# Patient Record
Sex: FEMALE | Race: WHITE | NOT HISPANIC OR LATINO | URBAN - METROPOLITAN AREA
[De-identification: names, ages, dates, MRNs, and addresses within clinical notes are randomized per-mention and may not be internally consistent; named-entity substitution may affect disease eponyms.]

---

## 2017-05-22 ENCOUNTER — HOSPITAL ENCOUNTER (OUTPATIENT)
Dept: RADIOLOGY | Facility: HOSPITAL | Age: 82
Discharge: HOME | End: 2017-05-22
Attending: INTERNAL MEDICINE

## 2017-05-22 ENCOUNTER — HOSPITAL ENCOUNTER (OUTPATIENT)
Dept: CARDIOLOGY | Facility: HOSPITAL | Age: 82
Discharge: HOME | End: 2017-05-22
Attending: INTERNAL MEDICINE

## 2017-05-22 ENCOUNTER — OFFICE VISIT - HEALTHEAST (OUTPATIENT)
Dept: CARDIOLOGY | Facility: CLINIC | Age: 82
End: 2017-05-22

## 2017-05-22 DIAGNOSIS — I10 ESSENTIAL HYPERTENSION WITH GOAL BLOOD PRESSURE LESS THAN 140/90: ICD-10-CM

## 2017-05-22 DIAGNOSIS — I25.10 ATHEROSCLEROSIS OF NATIVE CORONARY ARTERY OF NATIVE HEART WITHOUT ANGINA PECTORIS: ICD-10-CM

## 2017-05-22 DIAGNOSIS — R09.89 LUNG CRACKLES: ICD-10-CM

## 2017-05-22 DIAGNOSIS — I48.0 PAROXYSMAL ATRIAL FIBRILLATION (H): ICD-10-CM

## 2017-05-22 ASSESSMENT — MIFFLIN-ST. JEOR: SCORE: 871.7

## 2017-10-17 ENCOUNTER — AMBULATORY - HEALTHEAST (OUTPATIENT)
Dept: CARDIOLOGY | Facility: CLINIC | Age: 82
End: 2017-10-17

## 2018-04-23 ENCOUNTER — COMMUNICATION - HEALTHEAST (OUTPATIENT)
Dept: ADMINISTRATIVE | Facility: CLINIC | Age: 83
End: 2018-04-23

## 2021-05-30 VITALS — HEIGHT: 59 IN | WEIGHT: 127 LBS | BODY MASS INDEX: 25.6 KG/M2

## 2021-06-15 PROBLEM — R09.89 LUNG CRACKLES: Status: ACTIVE | Noted: 2017-05-22

## 2021-06-16 PROBLEM — I48.20 CHRONIC ATRIAL FIBRILLATION WITH RVR (H): Status: ACTIVE | Noted: 2017-10-15

## 2021-06-16 PROBLEM — I50.43 ACUTE ON CHRONIC COMBINED SYSTOLIC AND DIASTOLIC CHF (CONGESTIVE HEART FAILURE) (H): Status: ACTIVE | Noted: 2017-10-15

## 2021-06-25 NOTE — PROGRESS NOTES
Progress Notes by Fabrizio Amin MD at 5/22/2017 10:10 AM     Author: Fabrizio Amin MD Service: -- Author Type: Physician    Filed: 5/22/2017 12:17 PM Encounter Date: 5/22/2017 Status: Signed    : Fabrizio Amin MD (Physician)           Click to link to [facility] HEART CARE NOTE    Thank you, Dr. Toussaint, for asking us to see Misti Hall at the [facility].      Assessment/Recommendations   Patient with a known history of coronary artery disease as well as atrial fibrillation and atrial flutter.  She does feel some palpitations on a near daily basis and it does affect her functional capacity to a small degree as she needs to sit down and rest.  She also has some crackles at the right base which sound a bit dry in nature and I do not think she has pulmonary vascular  congestion but I cannot exclude that possibility.  She also complains of a very dry mouth.    We will check a basic metabolic panel to make sure that her BUN and creatinine are normal given her dry mouth and diuretic use.  We will check a Holter monitor to see what her rhythm disturbances when she has these symptoms as well as a chest x-ray because of the crackles.    We will get back to her with the results of these findings and any further recommendations.    Thank you for allowing us to participate in her care.         History of Present Illness    Ms. Misti Hall is a 89 y.o. female with a distant history of bypass surgery.  She has been feeling reasonably well of late but complains of a dry mouth, intermittent palpitations and shortness of breath with activity.  Her shortness of breath with activity may be a little more dramatic.  She did not get short of breath walking in here and she uses a four-wheel walker consistently.  She has not fallen.  She denies any chest discomfort.  She complains of a dry mouth which is been bothering her over the last 6 weeks.  She is taking the same  dose of furosemide for quite some time.    She also complains of intermittent palpitations where she feels her heart jumping and racing.  This will last a couple of minutes and she needs to sit down and rest and it seems to go away on its own.  This happens every day.    She denies orthopnea, paroxysmal nocturnal dyspnea and has some mild peripheral edema which she does not think is changed much of late.         Physical Examination Review of Systems   Vitals:    05/22/17 1006   BP: 124/80   Pulse: 88   Resp: 18     Body mass index is 25.65 kg/(m^2).  Wt Readings from Last 3 Encounters:   05/22/17 127 lb (57.6 kg)   04/04/16 121 lb (54.9 kg)   03/23/15 119 lb (54 kg)     General Appearance:   Alert, cooperative and in no acute distress.   ENT/Mouth: Oral mucuos membranes pink and moist .      EYES:  No scleral icterus. No Xanthelasma.    Neck: JVP normal. No Hepatojugular reflux. Thyroid not visualizedly   Chest/Lungs:   Lungs have dry crackles at the right base, equal chest wall expansion    Cardiovascular:   S1, S2 without murmur ,clicks or rubs. Brachial, radial  pulses are intact and symetric. No carotid bruits noted   Abdomen:  Nontender. BS+. No bruits.      Extremities: No cyanosis, clubbing or edema   Skin: no xanthelasma, warm.    Psych: Appropriate affect.   Neurologic: , normal  bilateral, no tremors        General: WNL  Eyes: WNL  Ears/Nose/Throat: WNL  Lungs: Wheezing  Heart: WNL  Stomach: WNL  Bladder: WNL  Muscle/Joints: WNL  Skin: WNL  Nervous System: WNL  Mental Health: WNL     Blood: WNL     Medical History  Surgical History Family History Social History   No past medical history on file. Past Surgical History:   Procedure Laterality Date   ? LA CABG, VEIN, SINGLE      Description: CABG (CABG);  Recorded: 02/29/2012;  Comments: approx. 2000, no records available    No family history on file. Social History     Social History   ? Marital status:      Spouse name: N/A   ? Number of  children: N/A   ? Years of education: N/A     Occupational History   ? Not on file.     Social History Main Topics   ? Smoking status: Never Smoker   ? Smokeless tobacco: Not on file   ? Alcohol use Not on file   ? Drug use: No   ? Sexual activity: Not on file     Other Topics Concern   ? Not on file     Social History Narrative          Medications  Allergies   Current Outpatient Prescriptions   Medication Sig Dispense Refill   ? atorvastatin (LIPITOR) 20 MG tablet Take 1 tablet by mouth daily.     ? furosemide (LASIX) 40 MG tablet Take 40 mg by mouth daily.     ? isosorbide mononitrate (IMDUR) 30 MG 24 hr tablet Take 30 mg by mouth daily.     ? levothyroxine (SYNTHROID, LEVOTHROID) 88 MCG tablet Take 88 mcg by mouth daily.     ? lisinopril (PRINIVIL,ZESTRIL) 10 MG tablet Take 1 tablet by mouth daily.     ? nitroglycerin (NITROSTAT) 0.4 MG SL tablet Take as directed     ? sotalol (BETAPACE) 80 MG tablet Take 80 mg by mouth every 12 (twelve) hours.       No current facility-administered medications for this visit.       No Known Allergies      Lab Results    Chemistry/lipid CBC Cardiac Enzymes/BNP/TSH/INR   Lab Results   Component Value Date    CREATININE 0.83 03/06/2012    BUN 28 03/06/2012    K 4.7 03/06/2012     03/06/2012     03/06/2012    CO2 28 03/06/2012    Lab Results   Component Value Date    HGB 13.6 04/14/2011    Lab Results   Component Value Date     (H) 03/06/2012